# Patient Record
Sex: MALE | Race: BLACK OR AFRICAN AMERICAN | NOT HISPANIC OR LATINO | Employment: OTHER | URBAN - METROPOLITAN AREA
[De-identification: names, ages, dates, MRNs, and addresses within clinical notes are randomized per-mention and may not be internally consistent; named-entity substitution may affect disease eponyms.]

---

## 2017-03-20 ENCOUNTER — HOSPITAL ENCOUNTER (EMERGENCY)
Facility: HOSPITAL | Age: 27
Discharge: HOME | End: 2017-03-20
Attending: EMERGENCY MEDICINE | Admitting: EMERGENCY MEDICINE

## 2017-03-20 VITALS
OXYGEN SATURATION: 99 % | WEIGHT: 138 LBS | BODY MASS INDEX: 18.29 KG/M2 | RESPIRATION RATE: 18 BRPM | TEMPERATURE: 98.5 F | HEART RATE: 60 BPM | SYSTOLIC BLOOD PRESSURE: 132 MMHG | HEIGHT: 73 IN | DIASTOLIC BLOOD PRESSURE: 80 MMHG

## 2017-03-20 DIAGNOSIS — S03.00XA TMJ (DISLOCATION OF TEMPOROMANDIBULAR JOINT), INITIAL ENCOUNTER: Primary | ICD-10-CM

## 2017-03-20 PROCEDURE — 99284 EMERGENCY DEPT VISIT MOD MDM: CPT

## 2017-03-20 RX ORDER — SODIUM CHLORIDE 0.9 % (FLUSH) 0.9 %
10 SYRINGE (ML) INJECTION AS NEEDED
Status: DISCONTINUED | OUTPATIENT
Start: 2017-03-20 | End: 2017-03-20 | Stop reason: HOSPADM

## 2017-03-20 RX ADMIN — Medication 50 MG: at 13:57

## 2017-03-20 NOTE — ED PROVIDER NOTES
Subjective   HPI Comments: Luís Prdao is a 27 y.o.male who presents to the ED with c/o jaw pain. The patient reports he was yawning last night at 2300, when he felt his jaw lock into place. He states he has been unable to properly re-place his jaw and has been experiencing continued pain, presenting into the ED for evaluation. In the ED the patient has his mouth stuck wide open, and is hardly able to talk, but denies any other acute complaints at this time.      Patient is a 27 y.o. male presenting with facial injury.   History provided by:  Patient  Facial Injury   Injury mechanism: jaw locked into place.  Location:  Face (jaw)  Pain details:     Quality:  Stiffness and pressure    Severity:  Moderate    Duration: 2300 last night.    Timing:  Constant    Progression:  Unchanged  Foreign body present:  No foreign bodies  Relieved by:  None tried  Worsened by:  Nothing  Ineffective treatments:  None tried  Associated symptoms: no epistaxis, no headaches, no neck pain, no rhinorrhea and no trismus        Review of Systems   Constitutional: Negative for chills and fever.   HENT: Negative for nosebleeds and rhinorrhea.         Unable to close mouth     Cardiovascular: Negative for chest pain.   Musculoskeletal: Negative for neck pain.   Neurological: Negative for headaches.   All other systems reviewed and are negative.      Past Medical History   Diagnosis Date   • Autism        No Known Allergies    History reviewed. No pertinent past surgical history.    History reviewed. No pertinent family history.    Social History     Social History   • Marital status: Single     Spouse name: N/A   • Number of children: N/A   • Years of education: N/A     Social History Main Topics   • Smoking status: Never Smoker   • Smokeless tobacco: None   • Alcohol use No   • Drug use: No   • Sexual activity: Not Asked     Other Topics Concern   • None     Social History Narrative   • None         Objective   Physical Exam    Constitutional: He is oriented to person, place, and time. He appears well-developed and well-nourished. No distress.   HENT:   Head: Normocephalic and atraumatic.   Mouth/Throat: Oropharynx is clear and moist.   Mouth is stuck wide open.   Eyes: Conjunctivae are normal. Pupils are equal, round, and reactive to light. No scleral icterus.   Neck: Normal range of motion. Neck supple.   Cardiovascular: Normal rate, regular rhythm and normal heart sounds.    Pulmonary/Chest: Effort normal and breath sounds normal. No respiratory distress.   Airway patent.   Abdominal: Soft. Bowel sounds are normal. There is no tenderness.   Musculoskeletal: Normal range of motion. He exhibits no edema.   Lymphadenopathy:     He has no cervical adenopathy.   Neurological: He is alert and oriented to person, place, and time.   Skin: Skin is warm and dry.   Psychiatric: He has a normal mood and affect. His behavior is normal.   Nursing note and vitals reviewed.      Procedural Sedation  Date/Time: 3/20/2017 1:55 PM  Performed by: AVINASH ARECHIGA  Authorized by: AVINASH ARECHIGA     Consent:     Consent obtained:  Verbal and written    Consent given by:  Patient    Risks discussed:  Nausea, vomiting and respiratory compromise necessitating ventilatory assistance and intubation  Indications:     Sedation purpose:  Dislocation reduction    Procedure necessitating sedation performed by:  Physician performing sedation    Intended level of sedation:  Moderate (conscious sedation)  Pre-sedation assessment:     ASA classification: class 1 - normal, healthy patient      Neck mobility: normal      Pre-sedation assessments completed and reviewed: airway patency, mental status and respiratory function      History of difficult intubation: no      Pre-sedation assessment completed:  3/20/2017 1:57 PM  Immediate pre-procedure details:     Reassessment: Patient reassessed immediately prior to procedure      Reviewed: vital signs      Verified: bag valve  "mask available, emergency equipment available, intubation equipment available, IV patency confirmed, oxygen available and suction available    Procedure details (see MAR for exact dosages):     Sedation start time:  3/20/2017 1:58 PM    Preoxygenation:  Bag valve mask    Sedation:  Methohexital (50mg)    Intra-procedure monitoring:  Blood pressure monitoring, cardiac monitor, frequent vital sign checks, continuous pulse oximetry and frequent LOC assessments    Intra-procedure events: none      Sedation end time:  3/20/2017 2:02 PM    Total sedation time (minutes):  5  Post-procedure details:     Post-sedation assessment completed:  3/20/2017 2:05 PM    Attendance: Constant attendance by certified staff until patient recovered      Recovery: Patient returned to pre-procedure baseline      Post-sedation assessments completed and reviewed: airway patency, mental status and respiratory function      Patient is stable for discharge or admission: yes      Patient tolerance:  Tolerated well, no immediate complications  Orthopedic Injury Treatment  Date/Time: 3/20/2017 1:55 PM  Performed by: AVINASH ARECHIGA  Authorized by: AVINASH ARECHIGA   Consent: Verbal consent obtained. Written consent obtained.  Risks and benefits: risks, benefits and alternatives were discussed  Consent given by: patient  Patient understanding: patient states understanding of the procedure being performed  Patient consent: the patient's understanding of the procedure matches consent given  Procedure consent: procedure consent matches procedure scheduled  Relevant documents: relevant documents present and verified  Required items: required blood products, implants, devices, and special equipment available  Patient identity confirmed: verbally with patient and arm band  Time out: Immediately prior to procedure a \"time out\" was called to verify the correct patient, procedure, equipment, support staff and site/side marked as required.  Injury location: " jaw  Location details: bilateral TMJ  Injury type: dislocation  Jaw dislocation chronicity: new  Pre-procedure range of motion: reduced  Sedation:  Patient sedated: yes  Sedation type: moderate (conscious) sedation   Sedatives: methohexital (50mg)  Sedation start date/time: 3/20/2017 1:57 PM  Sedation end date/time: 3/20/2017 2:02 PM  Vitals: Vital signs were monitored during sedation.    Manipulation performed: no  Post-procedure range of motion: normal  Patient tolerance: Patient tolerated the procedure well with no immediate complications               ED Course  ED Course     No results found for this or any previous visit (from the past 24 hour(s)).  Note: In addition to lab results from this visit, the labs listed above may include labs taken at another facility or during a different encounter within the last 24 hours. Please correlate lab times with ED admission and discharge times for further clarification of the services performed during this visit.    No orders to display     Vitals:    03/20/17 1448 03/20/17 1451 03/20/17 1454 03/20/17 1521   BP:    132/80   BP Location:       Patient Position:    Sitting   Pulse: 66 63 66 60   Resp:    18   Temp:       TempSrc:       SpO2:    99%   Weight:       Height:         Medications   methohexital (BREVITAL) injection solution prefilled syringe 100 mg (50 mg Intravenous Given 3/20/17 1357)     ECG/EMG Results (last 24 hour)     ** No results found for the last 24 hours. **                    [facility]    Final diagnoses:   TMJ (dislocation of temporomandibular joint), initial encounter       Documentation assistance provided by christopher Osborn.  Information recorded by the scribe was done at my direction and has been verified and validated by me.     Ellie Osborn  03/20/17 1216       Ellie Osborn  03/20/17 1406       Ellie Osborn  03/20/17 1432       Ellie Osborn  03/20/17 3089       Eduin Knowles MD  03/20/17 0906

## 2025-05-05 ENCOUNTER — APPOINTMENT (OUTPATIENT)
Dept: CT IMAGING | Facility: HOSPITAL | Age: 35
End: 2025-05-05
Payer: MEDICARE

## 2025-05-05 ENCOUNTER — APPOINTMENT (OUTPATIENT)
Dept: GENERAL RADIOLOGY | Facility: HOSPITAL | Age: 35
End: 2025-05-05
Payer: MEDICARE

## 2025-05-05 ENCOUNTER — HOSPITAL ENCOUNTER (EMERGENCY)
Facility: HOSPITAL | Age: 35
Discharge: HOME | End: 2025-05-05
Attending: EMERGENCY MEDICINE | Admitting: EMERGENCY MEDICINE
Payer: MEDICARE

## 2025-05-05 VITALS
RESPIRATION RATE: 18 BRPM | DIASTOLIC BLOOD PRESSURE: 89 MMHG | SYSTOLIC BLOOD PRESSURE: 135 MMHG | HEIGHT: 73 IN | BODY MASS INDEX: 18.55 KG/M2 | HEART RATE: 75 BPM | TEMPERATURE: 98.2 F | OXYGEN SATURATION: 99 % | WEIGHT: 140 LBS

## 2025-05-05 DIAGNOSIS — S03.00XA DISLOCATION OF TEMPOROMANDIBULAR JOINT, INITIAL ENCOUNTER: Primary | ICD-10-CM

## 2025-05-05 PROCEDURE — 25010000002 PROPOFOL 10 MG/ML EMULSION: Performed by: EMERGENCY MEDICINE

## 2025-05-05 PROCEDURE — 70330 X-RAY EXAM OF JAW JOINTS: CPT

## 2025-05-05 PROCEDURE — 99285 EMERGENCY DEPT VISIT HI MDM: CPT

## 2025-05-05 PROCEDURE — 70486 CT MAXILLOFACIAL W/O DYE: CPT

## 2025-05-05 RX ORDER — PROPOFOL 10 MG/ML
100 VIAL (ML) INTRAVENOUS ONCE
Status: DISCONTINUED | OUTPATIENT
Start: 2025-05-05 | End: 2025-05-05 | Stop reason: HOSPADM

## 2025-05-05 RX ORDER — PROPOFOL 10 MG/ML
VIAL (ML) INTRAVENOUS
Status: COMPLETED | OUTPATIENT
Start: 2025-05-05 | End: 2025-05-05

## 2025-05-05 RX ORDER — SODIUM CHLORIDE 0.9 % (FLUSH) 0.9 %
10 SYRINGE (ML) INJECTION AS NEEDED
Status: DISCONTINUED | OUTPATIENT
Start: 2025-05-05 | End: 2025-05-05 | Stop reason: HOSPADM

## 2025-05-05 RX ADMIN — PROPOFOL 50 MG: 10 INJECTION, EMULSION INTRAVENOUS at 05:21

## 2025-05-05 RX ADMIN — PROPOFOL 50 MG: 10 INJECTION, EMULSION INTRAVENOUS at 05:19

## 2025-05-05 NOTE — ED PROVIDER NOTES
Subjective   History of Present Illness  This is 35-year-old male presenting to the emergency department some jaw pain.  The patient states that he was yawning when he felt his jaw pop.  Feels like he dislocated his jaw.  He is unable to close it.  He has done this once before 2017.  Patient did not sustain any other trauma.  No difficulty swallowing.  No headache or change in vision.  No chest pain or shortness of breath    History provided by:  Patient   used: No        Review of Systems   Constitutional: Negative.    Respiratory: Negative.     Cardiovascular: Negative.    Musculoskeletal:  Positive for joint swelling and myalgias.   Neurological: Negative.        Past Medical History:   Diagnosis Date    Autism        No Known Allergies    No past surgical history on file.    No family history on file.    Social History     Socioeconomic History    Marital status: Single   Tobacco Use    Smoking status: Never   Substance and Sexual Activity    Alcohol use: No    Drug use: No           Objective   Physical Exam  Vitals and nursing note reviewed.   Constitutional:       General: He is not in acute distress.     Appearance: He is not ill-appearing or toxic-appearing.   HENT:      Head:      Comments: Patient has obvious dislocation of the mandible at the TMJ.  There is no obvious displacement.  Limited range of motion.  Throat clear.  Cardiovascular:      Pulses: Normal pulses.   Musculoskeletal:         General: Normal range of motion.   Neurological:      General: No focal deficit present.      Mental Status: He is alert.      Motor: No weakness.         Procedural Sedation    Date/Time: 5/5/2025 5:02 AM    Performed by: Bipin Soler MD  Authorized by: Bipin Soler MD    Consent:     Consent obtained:  Verbal and written    Consent given by:  Patient    Risks, benefits, and alternatives were discussed: yes      Risks discussed:  Allergic reaction, prolonged hypoxia resulting in  organ damage, prolonged sedation necessitating reversal, respiratory compromise necessitating ventilatory assistance and intubation and inadequate sedation    Alternatives discussed:  Analgesia without sedation, anxiolysis and regional anesthesia  [place] protocol:     Procedure explained and questions answered to patient or proxy's satisfaction: yes      Relevant documents present and verified: yes      Imaging studies available: yes      Site/side marked: yes      Immediately prior to procedure, a time out was called: yes      Patient identity confirmed:  Verbally with patient and arm band  Indications:     Procedure performed:  Dislocation reduction    Procedure necessitating sedation performed by:  Physician performing sedation    Intended level of sedation:  Moderate  Pre-sedation assessment:     Time since last food or drink:  6 hours    ASA classification: class 1 - normal, healthy patient      Mouth opening:  3 or more finger widths    Thyromental distance:  4 finger widths    Mallampati score:  I - soft palate, uvula, fauces, pillars visible    Neck mobility: normal      Pre-sedation assessments completed and reviewed: airway patency, anesthesia/sedation history, cardiovascular function, hydration status, mental status, nausea/vomiting, pain level, respiratory function and temperature      History of difficult intubation: no      Pre-sedation assessment completed:  5/5/2025 5:02 AM  Immediate pre-procedure details:     Reassessment: Patient reassessed immediately prior to procedure      Reviewed: vital signs, relevant labs/tests and NPO status      Verified: bag valve mask available, emergency equipment available, intubation equipment available, IV patency confirmed, oxygen available and suction available    Procedure details (see MAR for exact dosages):     Sedation start time:  5/5/2025 5:30 AM    Preoxygenation:  Nonrebreather mask and nasal cannula    Sedation:  Propofol    Intra-procedure monitoring:   Blood pressure monitoring, continuous capnometry, frequent LOC assessments, frequent vital sign checks, continuous pulse oximetry and cardiac monitor    Intra-procedure events: none      Sedation end time:  5/5/2025 5:40 AM    Total sedation time (minutes):  10  Post-procedure details:     Post-sedation assessment completed:  5/5/2025 5:40 AM    Attendance: Constant attendance by certified staff until patient recovered      Recovery: Patient returned to pre-procedure baseline      Estimated blood loss (see I/O flowsheets): no      Post-sedation assessments completed and reviewed: airway patency, cardiovascular function, hydration status, mental status, nausea/vomiting, pain level, respiratory function and temperature      Specimens recovered:  None    Patient is stable for discharge or admission: yes      Procedure completion:  Tolerated well, no immediate complications  Jaw Dislocation    Date/Time: 5/5/2025 5:40 AM    Performed by: Bipin Soler MD  Authorized by: Bipin Soler MD    Consent:     Consent obtained:  Written and verbal    Consent given by:  Patient    Risks, benefits, and alternatives were discussed: yes      Risks discussed:  Fracture, irreducible dislocation and recurrent dislocation    Alternatives discussed:  Delayed treatment  Universal protocol:     Procedure explained and questions answered to patient or proxy's satisfaction: yes      Imaging studies available: yes      Required blood products, implants, devices, and special equipment available: yes      Immediately prior to procedure, a time out was called: yes      Patient identity confirmed:  Verbally with patient and arm band  Pre-procedure details:     Injury location:  Bilateral TMJ    Chronicity:  New    Range of motion: reduced    Sedation:     Sedation type:  Moderate sedation  Procedure details:     Manipulation performed: yes      Jaw reduction method:  Downward posterior pressure    Reduction successful: yes       X-ray confirmed reduction: yes    Post-procedure details:     Range of motion: normal      Procedure completion:  Tolerated well, no immediate complications             ED Course  ED Course as of 05/05/25 0559   Mon May 05, 2025   0501 BP: 148/96 [JK]   0501 Temp: 98.2 °F (36.8 °C) [JK]   0501 Heart Rate: 87 [JK]   0501 Resp: 18 [JK]   0501 SpO2: 100 %  Interpretation:  Patient's vitals were directly viewed and interpreted by myself.   O2 sat 100% on room air, interpreted as normal.  Telemetry revealed a rate of 87 bpm, interpreted as normal sinus rhythm [JK]   0502 I have discussed the proposed procedure with the patient. I have informed the patient regarding the risks, benefits, side effects and expected outcomes and likelihood of achieving the goals for the procedure. I have also discussed alternatives of the procedure, the risks and benefits of the alternatives, and the possible results of not receiving this treatment. After discussion, the patient has agreed to undergo the procedure. [JK]   0558 CT Maxillofacial Without Contrast [JK]   0558 XR Temporomandibular Joints Bilateral  Interpretation:  Imaging was directly visualized by myself, per my interpretations, CT of the face showed appropriate relocation. [JK]   0558 On reevaluation, the patient is feeling better.  Range of motion improved.  Tolerating oral intake.  Patient will follow-up with PCP in 48 hours.  Given strict return precautions.  Verbalized understanding [JK]   0558 I had a discussion with the patient/family regarding diagnosis, diagnostic results, treatment plan, and medications. The patient/family indicated understanding of these instructions. I spent adequate time at the bedside prior to discharge necessary to discuss the aftercare instructions, giving patient education, providing explanations of the results of our evaluations/findings, and my decision making to assure that the patient/family understand the plan of care. Time was allotted to  answer questions at that time and throughout the ED course. Patient is required to maintain timely follow up, as discussed. I also discussed the potential for the development of an acute emergent condition requiring further evaluation, return to the ER, admission, or even surgical intervention.  I encouraged the patient to return to the emergency department immediately for any concerns, worsening symptoms, new complaints, or if symptoms persist and they are unable to seek follow-up in a timely fashion. The patient/family expressed understanding and agreement with this plan    Shared decision making:   After full review of the patient's clinical presentation, review of any work-up including but not limited to laboratory studies and radiology obtained, I had a discussion with the patient.  Treatment options were discussed as well as the risks, benefits and consequences.  I discussed all findings with the patient and family members if available.  During the discussion, treatment goals were understood by all as well as any misconceptions which were addressed with the patient.  Ample time was given for any questions they may have had.  They are in agreement with the treatment plan as well as final disposition. [JK]      ED Course User Index  [JK] Bipin Soler MD                                                       Medical Decision Making  This is a 35-year-old male presenting to the emergency department with jaw pain.  Patient is obvious TMJ dislocation.  He will require sedation for relocation.  Imaging obtained.  Workup initiated.      Differential diagnosis: TMJ dislocation, fracture, contusion, strain, sprain    Amount and/or Complexity of Data Reviewed  Radiology: ordered and independent interpretation performed. Decision-making details documented in ED Course.    Risk  Prescription drug management.        Final diagnoses:   Dislocation of temporomandibular joint, initial encounter       ED Disposition  ED  Disposition       ED Disposition   Discharge    Condition   Stable    Comment   --               Mayuri Benavidez MD  [address] [address]  [phone]    Call in 1 day           Medication List      No changes were made to your prescriptions during this visit.            Bipin Soler MD  05/05/25 0546